# Patient Record
Sex: FEMALE | Race: OTHER | Employment: UNEMPLOYED | URBAN - METROPOLITAN AREA
[De-identification: names, ages, dates, MRNs, and addresses within clinical notes are randomized per-mention and may not be internally consistent; named-entity substitution may affect disease eponyms.]

---

## 2018-04-21 ENCOUNTER — HOSPITAL ENCOUNTER (OUTPATIENT)
Age: 38
Discharge: HOME | End: 2018-04-21
Attending: FAMILY MEDICINE
Payer: MEDICAID

## 2018-04-21 VITALS
HEART RATE: 82 BPM | SYSTOLIC BLOOD PRESSURE: 118 MMHG | TEMPERATURE: 98 F | RESPIRATION RATE: 16 BRPM | DIASTOLIC BLOOD PRESSURE: 64 MMHG

## 2018-04-21 DIAGNOSIS — J06.9 VIRAL UPPER RESPIRATORY TRACT INFECTION: Primary | ICD-10-CM

## 2018-04-21 PROCEDURE — 99212 OFFICE O/P EST SF 10 MIN: CPT

## 2018-04-21 PROCEDURE — 87430 STREP A AG IA: CPT

## 2018-04-21 PROCEDURE — 99213 OFFICE O/P EST LOW 20 MIN: CPT

## 2018-04-21 NOTE — ED PROVIDER NOTES
Patient Seen in: [facility] [place]    History   Patient presents with:  Sore Throat    Stated Complaint: sore throat,ha    HPI    Pt is a 40 yo with congestion and throat pain x 2 days. No fevers. History reviewed.  No pertin negative. Discussed sx relief. F/U with PCP as needed.           Disposition and Plan     Clinical Impression:  Viral upper respiratory tract infection  (primary encounter diagnosis)    Disposition:  Discharge  4/21/2018  3:24 pm    Follow-up:  Archana Rodas

## 2024-02-09 ENCOUNTER — TELEPHONE (OUTPATIENT)
Dept: OBGYN CLINIC | Facility: CLINIC | Age: 44
End: 2024-02-09

## 2024-02-09 NOTE — TELEPHONE ENCOUNTER
Pt informed of clinic policy to see all providers.  Pt states she does not want to continue with pregnancy.  Pt advised we do not do terminations at our clinic.  Pt given info for [facility] and [facility]  phone number.  Pt expressed understanding.

## 2024-02-09 NOTE — TELEPHONE ENCOUNTER
Missed menses, LMP 12/22/2023  States she has no insurance and does not currently have [employer]. Agrees to pay full amount at visit.